# Patient Record
Sex: MALE
[De-identification: names, ages, dates, MRNs, and addresses within clinical notes are randomized per-mention and may not be internally consistent; named-entity substitution may affect disease eponyms.]

---

## 2017-11-27 PROBLEM — Z00.00 ENCOUNTER FOR PREVENTIVE HEALTH EXAMINATION: Status: ACTIVE | Noted: 2017-11-27

## 2017-12-08 ENCOUNTER — APPOINTMENT (OUTPATIENT)
Dept: ORTHOPEDIC SURGERY | Facility: CLINIC | Age: 57
End: 2017-12-08
Payer: COMMERCIAL

## 2017-12-08 VITALS — HEIGHT: 64 IN | BODY MASS INDEX: 35.85 KG/M2 | WEIGHT: 210 LBS

## 2017-12-08 VITALS — HEART RATE: 78 BPM | SYSTOLIC BLOOD PRESSURE: 137 MMHG | DIASTOLIC BLOOD PRESSURE: 83 MMHG

## 2017-12-08 DIAGNOSIS — M25.561 PAIN IN RIGHT KNEE: ICD-10-CM

## 2017-12-08 DIAGNOSIS — M25.562 PAIN IN RIGHT KNEE: ICD-10-CM

## 2017-12-08 PROCEDURE — 73562 X-RAY EXAM OF KNEE 3: CPT | Mod: RT

## 2017-12-08 PROCEDURE — 99203 OFFICE O/P NEW LOW 30 MIN: CPT

## 2018-03-23 ENCOUNTER — APPOINTMENT (OUTPATIENT)
Dept: ORTHOPEDIC SURGERY | Facility: CLINIC | Age: 58
End: 2018-03-23
Payer: COMMERCIAL

## 2018-03-23 DIAGNOSIS — M22.41 CHONDROMALACIA PATELLAE, RIGHT KNEE: ICD-10-CM

## 2018-03-23 DIAGNOSIS — S83.521D SPRAIN OF POSTERIOR CRUCIATE LIGAMENT OF RIGHT KNEE, SUBSEQUENT ENCOUNTER: ICD-10-CM

## 2018-03-23 PROCEDURE — 99213 OFFICE O/P EST LOW 20 MIN: CPT

## 2021-01-22 ENCOUNTER — EMERGENCY (EMERGENCY)
Facility: HOSPITAL | Age: 61
LOS: 1 days | Discharge: HOME | End: 2021-01-22
Attending: EMERGENCY MEDICINE
Payer: COMMERCIAL

## 2021-01-22 ENCOUNTER — NON-APPOINTMENT (OUTPATIENT)
Age: 61
End: 2021-01-22

## 2021-01-22 ENCOUNTER — APPOINTMENT (OUTPATIENT)
Dept: OPHTHALMOLOGY | Facility: CLINIC | Age: 61
End: 2021-01-22
Payer: COMMERCIAL

## 2021-01-22 VITALS
WEIGHT: 167.99 LBS | DIASTOLIC BLOOD PRESSURE: 82 MMHG | RESPIRATION RATE: 16 BRPM | OXYGEN SATURATION: 98 % | SYSTOLIC BLOOD PRESSURE: 158 MMHG | HEIGHT: 62 IN | TEMPERATURE: 98 F | HEART RATE: 81 BPM

## 2021-01-22 VITALS
RESPIRATION RATE: 17 BRPM | DIASTOLIC BLOOD PRESSURE: 72 MMHG | OXYGEN SATURATION: 98 % | HEART RATE: 66 BPM | SYSTOLIC BLOOD PRESSURE: 125 MMHG | TEMPERATURE: 98 F

## 2021-01-22 LAB
ANION GAP SERPL CALC-SCNC: 11 MMOL/L — SIGNIFICANT CHANGE UP (ref 5–17)
BASOPHILS # BLD AUTO: 0.06 K/UL — SIGNIFICANT CHANGE UP (ref 0–0.2)
BASOPHILS NFR BLD AUTO: 0.8 % — SIGNIFICANT CHANGE UP (ref 0–2)
BUN SERPL-MCNC: 18 MG/DL — SIGNIFICANT CHANGE UP (ref 7–23)
CALCIUM SERPL-MCNC: 9.8 MG/DL — SIGNIFICANT CHANGE UP (ref 8.4–10.5)
CHLORIDE SERPL-SCNC: 103 MMOL/L — SIGNIFICANT CHANGE UP (ref 96–108)
CO2 SERPL-SCNC: 25 MMOL/L — SIGNIFICANT CHANGE UP (ref 22–31)
CREAT SERPL-MCNC: 0.75 MG/DL — SIGNIFICANT CHANGE UP (ref 0.5–1.3)
EOSINOPHIL # BLD AUTO: 0.06 K/UL — SIGNIFICANT CHANGE UP (ref 0–0.5)
EOSINOPHIL NFR BLD AUTO: 0.8 % — SIGNIFICANT CHANGE UP (ref 0–6)
GLUCOSE SERPL-MCNC: 116 MG/DL — HIGH (ref 70–99)
HCT VFR BLD CALC: 48.3 % — SIGNIFICANT CHANGE UP (ref 39–50)
HGB BLD-MCNC: 15.2 G/DL — SIGNIFICANT CHANGE UP (ref 13–17)
IMM GRANULOCYTES NFR BLD AUTO: 0.4 % — SIGNIFICANT CHANGE UP (ref 0–1.5)
LYMPHOCYTES # BLD AUTO: 1.92 K/UL — SIGNIFICANT CHANGE UP (ref 1–3.3)
LYMPHOCYTES # BLD AUTO: 26.4 % — SIGNIFICANT CHANGE UP (ref 13–44)
MCHC RBC-ENTMCNC: 25.2 PG — LOW (ref 27–34)
MCHC RBC-ENTMCNC: 31.5 GM/DL — LOW (ref 32–36)
MCV RBC AUTO: 80.1 FL — SIGNIFICANT CHANGE UP (ref 80–100)
MONOCYTES # BLD AUTO: 0.61 K/UL — SIGNIFICANT CHANGE UP (ref 0–0.9)
MONOCYTES NFR BLD AUTO: 8.4 % — SIGNIFICANT CHANGE UP (ref 2–14)
NEUTROPHILS # BLD AUTO: 4.6 K/UL — SIGNIFICANT CHANGE UP (ref 1.8–7.4)
NEUTROPHILS NFR BLD AUTO: 63.2 % — SIGNIFICANT CHANGE UP (ref 43–77)
NRBC # BLD: 0 /100 WBCS — SIGNIFICANT CHANGE UP (ref 0–0)
PLATELET # BLD AUTO: 281 K/UL — SIGNIFICANT CHANGE UP (ref 150–400)
POTASSIUM SERPL-MCNC: 4.5 MMOL/L — SIGNIFICANT CHANGE UP (ref 3.5–5.3)
POTASSIUM SERPL-SCNC: 4.5 MMOL/L — SIGNIFICANT CHANGE UP (ref 3.5–5.3)
RBC # BLD: 6.03 M/UL — HIGH (ref 4.2–5.8)
RBC # FLD: 17.8 % — HIGH (ref 10.3–14.5)
SODIUM SERPL-SCNC: 139 MMOL/L — SIGNIFICANT CHANGE UP (ref 135–145)
WBC # BLD: 7.28 K/UL — SIGNIFICANT CHANGE UP (ref 3.8–10.5)
WBC # FLD AUTO: 7.28 K/UL — SIGNIFICANT CHANGE UP (ref 3.8–10.5)

## 2021-01-22 PROCEDURE — 70498 CT ANGIOGRAPHY NECK: CPT

## 2021-01-22 PROCEDURE — 70496 CT ANGIOGRAPHY HEAD: CPT

## 2021-01-22 PROCEDURE — 99284 EMERGENCY DEPT VISIT MOD MDM: CPT | Mod: 25

## 2021-01-22 PROCEDURE — 80048 BASIC METABOLIC PNL TOTAL CA: CPT

## 2021-01-22 PROCEDURE — 99285 EMERGENCY DEPT VISIT HI MDM: CPT

## 2021-01-22 PROCEDURE — 85025 COMPLETE CBC W/AUTO DIFF WBC: CPT

## 2021-01-22 PROCEDURE — 99205 OFFICE O/P NEW HI 60 MIN: CPT

## 2021-01-22 PROCEDURE — 99072 ADDL SUPL MATRL&STAF TM PHE: CPT

## 2021-01-22 PROCEDURE — 70496 CT ANGIOGRAPHY HEAD: CPT | Mod: 26,MA

## 2021-01-22 PROCEDURE — 70498 CT ANGIOGRAPHY NECK: CPT | Mod: 26,MA

## 2021-01-22 NOTE — CHART NOTE - NSCHARTNOTEFT_GEN_A_CORE
Ophthalmology chart note    60 year old male w/ h/o strabismus as a child, also has previous h/o diplopia in 2017 which lasted 2-3 weeks. PMH of DM and HTN, current episode of diplopia started 1 week ago.    In office exam:   VA 2/20 OD, 20/30 ph 20/25 OS  IOP 20 OD, 21 OS  Pupils: round and reactive OU, no APD  EOM OD w/ limitations in supraduction, infraduction, and adduction. rest is wnl OD and OS full.   Strab: XT OD    Anterior exam:   L/L: wnl OU  Conj: white OU  K: Cl OU  AC: D+Q  Iris: flat OU  lens: 1+ NS OU    DFE:   Vit: wnl OU  Nerve: no edema OU  Macula: flat OU  Vessels: wnl OU    A/P:  #right, pupil sparing, partial 3rd nerve palsy  - given h/o DM and HTN, likely microvascular, but must rule out PCOM aneurysm  - seen in office today w/ Dr. Navarro and was sent to ED for work-up  - recommend URGENT CTA head/neck or MRA head/neck Ophthalmology chart note    60 year old male w/ h/o strabismus as a child, also has previous h/o diplopia in 2017 which lasted 2-3 weeks. PMH of DM and HTN, current episode of diplopia started 1 week ago.    In office exam:   VA 2/20 OD, 20/30 ph 20/25 OS  IOP 20 OD, 21 OS  Pupils: round and reactive OU, no APD  EOM OD w/ limitations in supraduction, infraduction, and adduction. rest is wnl OD and OS full.   Strabismus: XT OD    Slit lamp exam:   Lids/lashes: wnl OU  Conjunctiva: white OU  Cornea: Cl OU  Anterior chamber: D+Q  Iris: flat OU  lens: 1+ NS OU    Dilated exam:   Vitreous: wnl OU  Nerve: no edema OU  Macula: flat OU  Vessels: wnl OU    A/P:  #right, pupil sparing, partial 3rd nerve palsy  - given h/o DM and HTN, likely microvascular, but must rule out PCOM aneurysm  - seen in office today w/ Dr. Navarro and was sent to ED for work-up  - recommend URGENT CTA head/neck or MRA head/neck

## 2021-01-22 NOTE — ED ADULT NURSE NOTE - OBJECTIVE STATEMENT
61 y/o male coming to the ER with c/o "double vision". A&Ox3. Ambulatory. PMH HTN, DM, polycythemia (patient reports he never required treatment). Patient reports he has had double vision for the past week. Patient states he was sent in by his eye doctor to r/o an  aneurysm. PERRL. Cardinal gazes intact, with limited lateral eye movement of the right eye. Patient denies chest pain, fever, chills, n/v/d, urinary symptoms, abdominal pain, numbness, tingling. Safety measures maintained. Bed in the lowest position. Call bell within reach.  MD at the bedside. No acute distress noted or further complaints at this time.

## 2021-01-22 NOTE — ED PROVIDER NOTE - NSFOLLOWUPINSTRUCTIONS_ED_ALL_ED_FT
Return to the ER if you have new chest pain, shortness of breath, fevers, inability to eat or drink, or worsening of symptoms that brought you to the emergency room today.    Follow up with DR Navarro as regularly scheduled.

## 2021-01-22 NOTE — ED ADULT NURSE REASSESSMENT NOTE - NS ED NURSE REASSESS COMMENT FT1
Patient d/c. Reviewed d/c paperwork with patients, all questions answered at this time. Patient verbalizes understanding. IV removed. Patient instructed to return to the ER for any worsening s/s including vision changes, severe headache, chest pain, SOB, fever, n/v/d. Patient has a follow up appointment with the eye doctor on 1/29/2020. Patient alert and stable at time of d/c.

## 2021-01-22 NOTE — ED PROVIDER NOTE - PROGRESS NOTE DETAILS
Attending MD Shah: Spoke with Dr. Navarro of Neuro-Ophthalmology, agrees with plan for CTH/CTA HN for 3rd nerve palsy CTA wnl, no e/o aneurysm. Will DC w/ f/u w/ Dr Navarro

## 2021-01-22 NOTE — ED PROVIDER NOTE - CARE PROVIDER_API CALL
Ludin Navarro (MD)  Ophthalmology  [address] [place] [address], [address]  [place] [address]  Phone: [phone]  Fax: [phone]  Follow Up Time:

## 2021-01-22 NOTE — ED PROVIDER NOTE - ATTENDING CONTRIBUTION TO CARE
Attending MD Shah: I personally have seen and examined this patient.  Resident note reviewed and agree on plan of care and except where noted.  See below for details.     Seen in [facility]    60M with PMH/PSH including DM, HTN (no longer on meds after lifestyle modifications and 48 lbs weight loss), COVID in 12/2020 (reports has had negative test) presents to the ED sent in by NeuroOphthalmologist Dr. Navarro for concern for third nerve palsy.  Reports that 8 days ago developed diplopia.  Reports diplopia resolves with covering either eye.  Reports that he had previous episode of diplopia in 2017 which resolved after about 3-4 weeks and the etiology unknown (reports given ophtho appt in 5 wks from onset and symptoms resolved spontaneously prior to being seen).  Denies all other physical complaints.  Denies headache, change in hearing.  Denies trauma, fall.  Denies chest pain, shortness of breath, palpitations. Denies abdominal pain, nausea, vomiting, diarrhea, blood in stools. Denies dysuria, hematuria, change in urinary habits including frequency, urgency. Denies loss of urinary or bowel continence. Denies numbness, weakness or tingling in extremities. Denies fevers, chills, dizziness, weakness. A ten (10) point review of systems was negative other than as stated in the HPI or elsewhere in the chart.     Exam:   General: NAD  HENT: head NCAT, airway patent with moist mucous membranes  Eyes: PERRL, +unable to adduct OD, in resting position OD down and out, EOMI OS  Lungs: lungs CTAB with good inspiratory effort, no wheezing, no rhonchi, no rales  Cardiac: +S1S2, no m/r/g  GI: abdomen soft with +BS, NT, ND  MSK: FROM at neck, no tenderness to midline palpation, no calf tenderness, swelling, erythema or warmth  Neuro: CN 2-12 grossly intact except CN3, moving all extremities with 5/5 strength bilateral upper and lower extremities, sensory grossly intact, no gross neuro deficits  Psych: normal mood and affect     A/P: 60M with diplopia, suspected third nerve palsy, DDx includes stroke, compressive aneurysm, will obtain labs, CTH/CTA H/N, ophtho resident made aware, will call neuro Attending MD Shah: I personally have seen and examined this patient.  Resident note reviewed and agree on plan of care and except where noted.  See below for details.     Seen in [facility]    60M with PMH/PSH including DM, HTN (no longer on meds after lifestyle modifications and 48 lbs weight loss), COVID in 12/2020 (reports has had negative test) presents to the ED sent in by NeuroOphthalmologist Dr. Navarro for concern for third nerve palsy.  Reports that 8 days ago developed diplopia.  Reports diplopia resolves with covering either eye.  Reports that he had previous episode of diplopia in 2017 which resolved after about 3-4 weeks and the etiology unknown (reports given ophtho appt in 5 wks from onset and symptoms resolved spontaneously prior to being seen).  Denies all other physical complaints.  Denies headache, change in hearing.  Denies trauma, fall.  Denies chest pain, shortness of breath, palpitations. Denies abdominal pain, nausea, vomiting, diarrhea, blood in stools. Denies dysuria, hematuria, change in urinary habits including frequency, urgency. Denies loss of urinary or bowel continence. Denies numbness, weakness or tingling in extremities. Denies fevers, chills, dizziness, weakness. A ten (10) point review of systems was negative other than as stated in the HPI or elsewhere in the chart.     Exam:   General: NAD  HENT: head NCAT, airway patent with moist mucous membranes  Eyes: PERRL, +unable to adduct OD, in resting position OD down and out, EOMI OS  Lungs: lungs CTAB with good inspiratory effort, no wheezing, no rhonchi, no rales  Cardiac: +S1S2, no m/r/g  GI: abdomen soft with +BS, NT, ND  MSK: FROM at neck, no tenderness to midline palpation, no calf tenderness, swelling, erythema or warmth  Neuro: CN 2-12 grossly intact except CN3, moving all extremities with 5/5 strength bilateral upper and lower extremities, sensory grossly intact, no gross neuro deficits  Psych: normal mood and affect     A/P: 60M with diplopia, suspected third nerve palsy, DDx includes stroke, compressive aneurysm, will obtain labs, CTH/CTA H/N, ophtho resident made aware

## 2021-01-22 NOTE — ED PROVIDER NOTE - PATIENT PORTAL LINK FT
You can access the FollowMyHealth Patient Portal offered by [facility] by registering at the following website: [email or web address]. By joining [employer]’s FollowMyHealth portal, you will also be able to view your health information using other applications (apps) compatible with our system.

## 2021-01-22 NOTE — ED PROVIDER NOTE - EYES, MLM
Clear bilaterally, pupils equal, round and reactive to light. R eye unable to elevate or adduct on EOM exam. Remainder of EOM intact. Visual acuity L eye: 20/40, R eye: 20/40 done with corrected vision but an "old prescription" per pt.

## 2021-01-22 NOTE — ED PROVIDER NOTE - CLINICAL SUMMARY MEDICAL DECISION MAKING FREE TEXT BOX
Pt p/w visual changes and c/f aneurysm, on exam has mild EOM deficits no gross visual acuity problems, no other focal neuro deficits. Will obtain CT, basic labs, and reassess for dispo Pt p/w visual changes and c/f aneurysm, on exam has mild EOM deficits no gross visual acuity problems, no other focal neuro deficits. Will obtain CT, basic labs, and reassess for dispo.

## 2021-01-22 NOTE — ED PROVIDER NOTE - OBJECTIVE STATEMENT
60 M hx HTN DM (now not on any medications after 1yr of strict keto diet per pt), presenting with visual changes. Started to develop diplopia 8 days ago and got in to see Ramon of neuroophtho today, who think she has a CN 3 palsy and sent him in to r/o an aneurysm as cause of his visual deficits. He denies recent fever, focal numbness or weakness, difficulty w/ speech or swallowing. 60 M hx HTN DM (now not on any medications after 1yr of strict keto diet per pt), presenting with visual changes. Started to develop diplopia 8 days ago and got in to see Ramon of neuroophtho today, who think she has a CN 3 palsy and sent him in to r/o an aneurysm as cause of his visual deficits. He denies recent fever, focal numbness or weakness, difficulty w/ speech or swallowing. He had COVID in decemeber has since had resolution of his symptoms and had a negative test.

## 2021-01-29 ENCOUNTER — APPOINTMENT (OUTPATIENT)
Dept: OPHTHALMOLOGY | Facility: CLINIC | Age: 61
End: 2021-01-29
Payer: COMMERCIAL

## 2021-01-29 ENCOUNTER — NON-APPOINTMENT (OUTPATIENT)
Age: 61
End: 2021-01-29

## 2021-01-29 PROCEDURE — 99072 ADDL SUPL MATRL&STAF TM PHE: CPT

## 2021-01-29 PROCEDURE — 92012 INTRM OPH EXAM EST PATIENT: CPT

## 2021-03-26 ENCOUNTER — APPOINTMENT (OUTPATIENT)
Dept: OPHTHALMOLOGY | Facility: CLINIC | Age: 61
End: 2021-03-26
Payer: COMMERCIAL

## 2021-03-26 ENCOUNTER — NON-APPOINTMENT (OUTPATIENT)
Age: 61
End: 2021-03-26

## 2021-03-26 PROCEDURE — 92012 INTRM OPH EXAM EST PATIENT: CPT

## 2021-03-26 PROCEDURE — 99072 ADDL SUPL MATRL&STAF TM PHE: CPT

## 2023-01-18 ENCOUNTER — APPOINTMENT (OUTPATIENT)
Dept: OPHTHALMOLOGY | Facility: CLINIC | Age: 63
End: 2023-01-18
Payer: COMMERCIAL

## 2023-01-18 ENCOUNTER — NON-APPOINTMENT (OUTPATIENT)
Age: 63
End: 2023-01-18

## 2023-01-18 PROCEDURE — V2718: CPT

## 2023-01-18 PROCEDURE — 92060 SENSORIMOTOR EXAMINATION: CPT

## 2023-01-18 PROCEDURE — 92012 INTRM OPH EXAM EST PATIENT: CPT

## 2023-03-03 ENCOUNTER — NON-APPOINTMENT (OUTPATIENT)
Age: 63
End: 2023-03-03

## 2023-03-03 ENCOUNTER — APPOINTMENT (OUTPATIENT)
Dept: OPHTHALMOLOGY | Facility: CLINIC | Age: 63
End: 2023-03-03
Payer: COMMERCIAL

## 2023-03-03 PROCEDURE — 92012 INTRM OPH EXAM EST PATIENT: CPT

## 2024-02-23 ENCOUNTER — APPOINTMENT (OUTPATIENT)
Dept: ORTHOPEDIC SURGERY | Facility: CLINIC | Age: 64
End: 2024-02-23
Payer: COMMERCIAL

## 2024-02-23 VITALS
DIASTOLIC BLOOD PRESSURE: 93 MMHG | SYSTOLIC BLOOD PRESSURE: 161 MMHG | HEIGHT: 65 IN | BODY MASS INDEX: 34.82 KG/M2 | HEART RATE: 102 BPM | WEIGHT: 209 LBS

## 2024-02-23 DIAGNOSIS — M17.12 UNILATERAL PRIMARY OSTEOARTHRITIS, LEFT KNEE: ICD-10-CM

## 2024-02-23 DIAGNOSIS — M25.511 PAIN IN RIGHT SHOULDER: ICD-10-CM

## 2024-02-23 DIAGNOSIS — M75.41 IMPINGEMENT SYNDROME OF RIGHT SHOULDER: ICD-10-CM

## 2024-02-23 PROCEDURE — 99203 OFFICE O/P NEW LOW 30 MIN: CPT

## 2024-02-23 PROCEDURE — 73030 X-RAY EXAM OF SHOULDER: CPT | Mod: RT

## 2024-02-23 PROCEDURE — 73562 X-RAY EXAM OF KNEE 3: CPT | Mod: LT
